# Patient Record
(demographics unavailable — no encounter records)

---

## 2024-12-17 NOTE — HISTORY OF PRESENT ILLNESS
[FreeTextEntry1] : 45 yo here for a rash on buttocks has changed underware and no improvement got worse from one brand. also has a cut near clitoral area and folliculitis.  she did yeast medication in the vagina 2 weeks ago.

## 2024-12-17 NOTE — PHYSICAL EXAM
[Chaperone Declined] : Patient declined chaperone [Appropriately responsive] : appropriately responsive [Alert] : alert [No Acute Distress] : no acute distress [Labia Majora] : normal [Labia Minora] : normal [Normal] : normal [FreeTextEntry1] : cut near clitorus, atopic contact rash on buttocks and folliculitis.

## 2024-12-17 NOTE — DISCUSSION/SUMMARY
[FreeTextEntry1] : ointment buttocks are coconut oil to the cut   solution for folliculitis.  sent cultures
